# Patient Record
Sex: FEMALE | Race: WHITE
[De-identification: names, ages, dates, MRNs, and addresses within clinical notes are randomized per-mention and may not be internally consistent; named-entity substitution may affect disease eponyms.]

---

## 2019-02-13 ENCOUNTER — HOSPITAL ENCOUNTER (EMERGENCY)
Dept: HOSPITAL 47 - EC | Age: 34
Discharge: HOME | End: 2019-02-13
Payer: COMMERCIAL

## 2019-02-13 VITALS — RESPIRATION RATE: 15 BRPM | SYSTOLIC BLOOD PRESSURE: 129 MMHG | HEART RATE: 86 BPM | DIASTOLIC BLOOD PRESSURE: 83 MMHG

## 2019-02-13 VITALS — TEMPERATURE: 98.1 F

## 2019-02-13 DIAGNOSIS — R09.81: ICD-10-CM

## 2019-02-13 DIAGNOSIS — F17.200: ICD-10-CM

## 2019-02-13 DIAGNOSIS — Z86.69: ICD-10-CM

## 2019-02-13 DIAGNOSIS — H57.11: ICD-10-CM

## 2019-02-13 DIAGNOSIS — R51: Primary | ICD-10-CM

## 2019-02-13 LAB
ALBUMIN SERPL-MCNC: 4.6 G/DL (ref 3.5–5)
ALP SERPL-CCNC: 43 U/L (ref 38–126)
ALT SERPL-CCNC: 26 U/L (ref 9–52)
ANION GAP SERPL CALC-SCNC: 10 MMOL/L
AST SERPL-CCNC: 16 U/L (ref 14–36)
BASOPHILS # BLD AUTO: 0 K/UL (ref 0–0.2)
BASOPHILS NFR BLD AUTO: 0 %
BUN SERPL-SCNC: 10 MG/DL (ref 7–17)
CALCIUM SPEC-MCNC: 9.8 MG/DL (ref 8.4–10.2)
CHLORIDE SERPL-SCNC: 107 MMOL/L (ref 98–107)
CO2 SERPL-SCNC: 23 MMOL/L (ref 22–30)
EOSINOPHIL # BLD AUTO: 0.1 K/UL (ref 0–0.7)
EOSINOPHIL NFR BLD AUTO: 1 %
ERYTHROCYTE [DISTWIDTH] IN BLOOD BY AUTOMATED COUNT: 4.01 M/UL (ref 3.8–5.4)
ERYTHROCYTE [DISTWIDTH] IN BLOOD: 13.6 % (ref 11.5–15.5)
GLUCOSE SERPL-MCNC: 83 MG/DL (ref 74–99)
HCT VFR BLD AUTO: 34.4 % (ref 34–46)
HGB BLD-MCNC: 11.3 GM/DL (ref 11.4–16)
LYMPHOCYTES # SPEC AUTO: 1.1 K/UL (ref 1–4.8)
LYMPHOCYTES NFR SPEC AUTO: 20 %
MCH RBC QN AUTO: 28.3 PG (ref 25–35)
MCHC RBC AUTO-ENTMCNC: 32.9 G/DL (ref 31–37)
MCV RBC AUTO: 85.9 FL (ref 80–100)
MONOCYTES # BLD AUTO: 0.2 K/UL (ref 0–1)
MONOCYTES NFR BLD AUTO: 4 %
NEUTROPHILS # BLD AUTO: 4 K/UL (ref 1.3–7.7)
NEUTROPHILS NFR BLD AUTO: 74 %
PLATELET # BLD AUTO: 327 K/UL (ref 150–450)
POTASSIUM SERPL-SCNC: 4.3 MMOL/L (ref 3.5–5.1)
PROT SERPL-MCNC: 7.6 G/DL (ref 6.3–8.2)
SODIUM SERPL-SCNC: 140 MMOL/L (ref 137–145)
WBC # BLD AUTO: 5.4 K/UL (ref 3.8–10.6)

## 2019-02-13 PROCEDURE — 96361 HYDRATE IV INFUSION ADD-ON: CPT

## 2019-02-13 PROCEDURE — 96374 THER/PROPH/DIAG INJ IV PUSH: CPT

## 2019-02-13 PROCEDURE — 96375 TX/PRO/DX INJ NEW DRUG ADDON: CPT

## 2019-02-13 PROCEDURE — 99284 EMERGENCY DEPT VISIT MOD MDM: CPT

## 2019-02-13 PROCEDURE — 85025 COMPLETE CBC W/AUTO DIFF WBC: CPT

## 2019-02-13 PROCEDURE — 36415 COLL VENOUS BLD VENIPUNCTURE: CPT

## 2019-02-13 PROCEDURE — 70450 CT HEAD/BRAIN W/O DYE: CPT

## 2019-02-13 PROCEDURE — 85652 RBC SED RATE AUTOMATED: CPT

## 2019-02-13 PROCEDURE — 80053 COMPREHEN METABOLIC PANEL: CPT

## 2019-02-13 NOTE — CT
EXAMINATION TYPE: CT brain wo con

 

DATE OF EXAM: 2/13/2019

 

COMPARISON: None

 

HISTORY: headache

 

CT DLP: 1038.4 mGycm

 

Unenhanced CT of the brain was performed.  

 

The ventricles, basal cisterns and sulci overlying the cerebral convexities demonstrate a normal appe
arance.  

 

There is no evidence for intracranial hemorrhage or sulcal effacement.  

 

No mass effects are seen.  

 

Osseous calvarium is intact. Mild sphenoid sinus mucosal thickening.

 

If symptoms persist consider MRI as clinically warranted.  

 

IMPRESSION:

 

1.  No acute intracranial process is seen at this time.

## 2019-02-13 NOTE — ED
General Adult HPI





- General


Chief complaint: Eye Problems


Stated complaint: Eye problems


Time Seen by Provider: 02/13/19 13:18


Source: patient, RN notes reviewed


Mode of arrival: ambulatory


Limitations: no limitations





- History of Present Illness


Initial comments: 





33-year-old female presents to the emergency department for a chief complaint 

of headache.  Patient states the headache is on the right side of her head.  

Patient states yesterday she started to feel like she had congestion in the 

nose.  She states she was also sneezing a lot.  Patient states today she woke 

up and started to have right eye pain.  She states congestion on left side of 

face and nose has resolved but she still has congestion on the right side.  

Patient does have a history of migraines.  She states this head pain is similar 

to previous migraines.  However patient states her eye does not usually heart 

with this.  Patient does state it was watering earlier.  She denies drainage 

otherwise.  She states it feels somewhat swollen around the eye.  She denies 

fevers or chills.  She denies any neck stiffness.Patient has no other 

complaints at this time including shortness of breath, chest pain, abdominal 

pain, nausea or vomiting, headache, or visual changes.





- Related Data


 Home Medications











 Medication  Instructions  Recorded  Confirmed


 


Phenylephrine HCl [Sudafed PE] 10 mg PO Q4H PRN 02/13/19 02/13/19











 Allergies











Allergy/AdvReac Type Severity Reaction Status Date / Time


 


No Known Allergies Allergy   Verified 02/13/19 13:34














Review of Systems


ROS Statement: 


Those systems with pertinent positive or pertinent negative responses have been 

documented in the HPI.





ROS Other: All systems not noted in ROS Statement are negative.





Past Medical History


Past Medical History: No Reported History


History of Any Multi-Drug Resistant Organisms: None Reported


Past Surgical History: Back Surgery, Tubal Ligation


Additional Past Surgical History / Comment(s): colon biopsy


Past Psychological History: No Psychological Hx Reported


Smoking Status: Current some day smoker


Past Alcohol Use History: Occasional


Past Drug Use History: None Reported





General Exam


Limitations: no limitations


General appearance: alert, in no apparent distress


Head exam: Present: atraumatic, normocephalic, normal inspection


Eye exam: Present: normal appearance, PERRL, EOMI.  Absent: scleral icterus, 

conjunctival injection (No conjunctival erythema.), periorbital swelling (I do 

not appreciate any significant edema noted around the right eye), periorbital 

tenderness


ENT exam: Present: normal exam, normal oropharynx, mucous membranes moist, TM's 

normal bilaterally, normal external ear exam


Neck exam: Present: normal inspection, full ROM.  Absent: tenderness, 

meningismus, lymphadenopathy


Respiratory exam: Present: normal lung sounds bilaterally.  Absent: respiratory 

distress, wheezes, rales, rhonchi, stridor


Cardiovascular Exam: Present: regular rate, normal rhythm, normal heart sounds.

  Absent: systolic murmur, diastolic murmur, rubs, gallop, clicks


Neurological exam: Present: alert, oriented X3, CN II-XII intact, normal gait


  ** Expanded


Speech: Present: fluid speech


Cranial nerves: EOM's Intact: Normal, Tongue Deviation: Normal, Nystagmus: 

Normal, Facial Sensation: Normal


Cerebellar function: Finger to Nose: Normal


Upper motor neuron: Pronator Drift: Normal


Sensory exam: Upper Extremity Light Touch: Normal, Upper Extremity Pin Prick: 

Normal, Lower Extremity Light Touch: Normal, Lower Extremity Pin Prick: Normal


Motor strength exam: RUE: 5, LUE: 5, RLE: 5, LLE: 5


Eye Response: (4) open spontaneously


Motor Response: (6) obeys commands


Verbal Response: (5) oriented


Juanita Total: 15


Psychiatric exam: Present: normal affect, normal mood





Course


 Vital Signs











  02/13/19 02/13/19 02/13/19





  13:04 15:21 16:25


 


Temperature 98.1 F  


 


Pulse Rate 102 H 85 93


 


Respiratory 18 16 16





Rate   


 


Blood Pressure 131/87 130/88 118/85


 


O2 Sat by Pulse 98 100 100





Oximetry   














  02/13/19





  17:33


 


Temperature 


 


Pulse Rate 86


 


Respiratory 15





Rate 


 


Blood Pressure 129/83


 


O2 Sat by Pulse 100





Oximetry 














Medical Decision Making





- Medical Decision Making





33-year-old female presents to the emergency department for a chief complaint 

of right-sided headache.  Patient states this started this morning.  She states 

it came on gradually throughout the night.  Patient states this pain is 

consistent with previous migraines.  However today I pain began which is not 

usual for patient.  She denies visual changes, double vision, blurry vision.  

No focal neuro deficits on exam.  I do not notice any appreciable swelling 

noted of the right eye, no erythema of the periorbital structures or evidence 

of cellulitis. No pain with EOM. No erythema of the conjunctiva.  No drainage 

noted from the eye.  CT brain was negative for acute process.  CBC and CMP are 

unremarkable.  White blood cell count is 5.4, within normal limits.  Patient is 

afebrile.  Patient was given pain medication and is feeling much better at this 

time.  She was given referral to primary care.  However I did request the 

patient return here to the emergency Department if she has any worsening 

symptoms that she agrees with.  She will follow up with primary care in 1-2 

days.





- Lab Data


Result diagrams: 


 02/13/19 15:30





 02/13/19 15:30


 Lab Results











  02/13/19 02/13/19 Range/Units





  15:30 15:30 


 


WBC  5.4   (3.8-10.6)  k/uL


 


RBC  4.01   (3.80-5.40)  m/uL


 


Hgb  11.3 L   (11.4-16.0)  gm/dL


 


Hct  34.4   (34.0-46.0)  %


 


MCV  85.9   (80.0-100.0)  fL


 


MCH  28.3   (25.0-35.0)  pg


 


MCHC  32.9   (31.0-37.0)  g/dL


 


RDW  13.6   (11.5-15.5)  %


 


Plt Count  327   (150-450)  k/uL


 


Neutrophils %  74   %


 


Lymphocytes %  20   %


 


Monocytes %  4   %


 


Eosinophils %  1   %


 


Basophils %  0   %


 


Neutrophils #  4.0   (1.3-7.7)  k/uL


 


Lymphocytes #  1.1   (1.0-4.8)  k/uL


 


Monocytes #  0.2   (0-1.0)  k/uL


 


Eosinophils #  0.1   (0-0.7)  k/uL


 


Basophils #  0.0   (0-0.2)  k/uL


 


ESR  13   (0-20)  mm/hr


 


Sodium   140  (137-145)  mmol/L


 


Potassium   4.3  (3.5-5.1)  mmol/L


 


Chloride   107  ()  mmol/L


 


Carbon Dioxide   23  (22-30)  mmol/L


 


Anion Gap   10  mmol/L


 


BUN   10  (7-17)  mg/dL


 


Creatinine   0.67  (0.52-1.04)  mg/dL


 


Est GFR (CKD-EPI)AfAm   >90  (>60 ml/min/1.73 sqM)  


 


Est GFR (CKD-EPI)NonAf   >90  (>60 ml/min/1.73 sqM)  


 


Glucose   83  (74-99)  mg/dL


 


Calcium   9.8  (8.4-10.2)  mg/dL


 


Total Bilirubin   0.5  (0.2-1.3)  mg/dL


 


AST   16  (14-36)  U/L


 


ALT   26  (9-52)  U/L


 


Alkaline Phosphatase   43  ()  U/L


 


Total Protein   7.6  (6.3-8.2)  g/dL


 


Albumin   4.6  (3.5-5.0)  g/dL














Disposition


Clinical Impression: 


 Headache





Disposition: HOME SELF-CARE


Condition: Good


Instructions (If sedation given, give patient instructions):  Migraine Headache 

(ED), Eye Pain (ED)


Additional Instructions: 


Please take Motrin and Tylenol for pain.  Please follow-up with primary care in 

1-2 days.  Please return to the emergency department if you have any worsening 

symptoms.


Is patient prescribed a controlled substance at d/c from ED?: No


Referrals: 


Ashley Tobar MD [STAFF PHYSICIAN] - 1-2 days


Time of Disposition: 16:52

## 2020-11-04 ENCOUNTER — HOSPITAL ENCOUNTER (EMERGENCY)
Dept: HOSPITAL 47 - EC | Age: 35
Discharge: HOME | End: 2020-11-04
Payer: COMMERCIAL

## 2020-11-04 VITALS — SYSTOLIC BLOOD PRESSURE: 136 MMHG | DIASTOLIC BLOOD PRESSURE: 96 MMHG | RESPIRATION RATE: 17 BRPM | HEART RATE: 100 BPM

## 2020-11-04 VITALS — TEMPERATURE: 97.4 F

## 2020-11-04 DIAGNOSIS — R00.0: ICD-10-CM

## 2020-11-04 DIAGNOSIS — R00.2: Primary | ICD-10-CM

## 2020-11-04 DIAGNOSIS — F17.290: ICD-10-CM

## 2020-11-04 LAB
ALBUMIN SERPL-MCNC: 5.4 G/DL (ref 3.5–5)
ALP SERPL-CCNC: 59 U/L (ref 38–126)
ALT SERPL-CCNC: 11 U/L (ref 4–34)
ANION GAP SERPL CALC-SCNC: 14 MMOL/L
APTT BLD: 20.5 SEC (ref 22–30)
AST SERPL-CCNC: 19 U/L (ref 14–36)
BASOPHILS # BLD AUTO: 0 K/UL (ref 0–0.2)
BASOPHILS NFR BLD AUTO: 0 %
BUN SERPL-SCNC: 10 MG/DL (ref 7–17)
CALCIUM SPEC-MCNC: 10.7 MG/DL (ref 8.4–10.2)
CHLORIDE SERPL-SCNC: 107 MMOL/L (ref 98–107)
CO2 SERPL-SCNC: 19 MMOL/L (ref 22–30)
D DIMER PPP FEU-MCNC: 0.21 MG/L FEU (ref ?–0.6)
EOSINOPHIL # BLD AUTO: 0 K/UL (ref 0–0.7)
EOSINOPHIL NFR BLD AUTO: 0 %
ERYTHROCYTE [DISTWIDTH] IN BLOOD BY AUTOMATED COUNT: 4.61 M/UL (ref 3.8–5.4)
ERYTHROCYTE [DISTWIDTH] IN BLOOD: 14.8 % (ref 11.5–15.5)
GLUCOSE SERPL-MCNC: 92 MG/DL (ref 74–99)
HCT VFR BLD AUTO: 35.1 % (ref 34–46)
HGB BLD-MCNC: 10.5 GM/DL (ref 11.4–16)
INR PPP: 1 (ref ?–1.2)
LYMPHOCYTES # SPEC AUTO: 1.4 K/UL (ref 1–4.8)
LYMPHOCYTES NFR SPEC AUTO: 19 %
MAGNESIUM SPEC-SCNC: 2.2 MG/DL (ref 1.6–2.3)
MCH RBC QN AUTO: 22.9 PG (ref 25–35)
MCHC RBC AUTO-ENTMCNC: 30 G/DL (ref 31–37)
MCV RBC AUTO: 76.2 FL (ref 80–100)
MONOCYTES # BLD AUTO: 0.2 K/UL (ref 0–1)
MONOCYTES NFR BLD AUTO: 3 %
NEUTROPHILS # BLD AUTO: 5.8 K/UL (ref 1.3–7.7)
NEUTROPHILS NFR BLD AUTO: 77 %
PH UR: 6 [PH] (ref 5–8)
PLATELET # BLD AUTO: 444 K/UL (ref 150–450)
POTASSIUM SERPL-SCNC: 4.2 MMOL/L (ref 3.5–5.1)
PROT SERPL-MCNC: 9.4 G/DL (ref 6.3–8.2)
PT BLD: 10.1 SEC (ref 9–12)
SODIUM SERPL-SCNC: 140 MMOL/L (ref 137–145)
SP GR UR: 1.01 (ref 1–1.03)
UROBILINOGEN UR QL STRIP: <2 MG/DL (ref ?–2)
WBC # BLD AUTO: 7.5 K/UL (ref 3.8–10.6)

## 2020-11-04 PROCEDURE — 83735 ASSAY OF MAGNESIUM: CPT

## 2020-11-04 PROCEDURE — 84484 ASSAY OF TROPONIN QUANT: CPT

## 2020-11-04 PROCEDURE — 85730 THROMBOPLASTIN TIME PARTIAL: CPT

## 2020-11-04 PROCEDURE — 71046 X-RAY EXAM CHEST 2 VIEWS: CPT

## 2020-11-04 PROCEDURE — 99285 EMERGENCY DEPT VISIT HI MDM: CPT

## 2020-11-04 PROCEDURE — 93005 ELECTROCARDIOGRAM TRACING: CPT

## 2020-11-04 PROCEDURE — 81003 URINALYSIS AUTO W/O SCOPE: CPT

## 2020-11-04 PROCEDURE — 85025 COMPLETE CBC W/AUTO DIFF WBC: CPT

## 2020-11-04 PROCEDURE — 85379 FIBRIN DEGRADATION QUANT: CPT

## 2020-11-04 PROCEDURE — 96374 THER/PROPH/DIAG INJ IV PUSH: CPT

## 2020-11-04 PROCEDURE — 96361 HYDRATE IV INFUSION ADD-ON: CPT

## 2020-11-04 PROCEDURE — 80053 COMPREHEN METABOLIC PANEL: CPT

## 2020-11-04 PROCEDURE — 84443 ASSAY THYROID STIM HORMONE: CPT

## 2020-11-04 PROCEDURE — 36415 COLL VENOUS BLD VENIPUNCTURE: CPT

## 2020-11-04 PROCEDURE — 85610 PROTHROMBIN TIME: CPT

## 2020-11-04 NOTE — ED
General Adult HPI





- General


Chief complaint: Arrhythmia/Palpitations


Stated complaint: Racing heart,anxiety


Time Seen by Provider: 11/04/20 19:15


Source: patient


Mode of arrival: ambulatory


Limitations: no limitations





- History of Present Illness


Initial comments: 





35-year-old female presents to the emergency Department with complaints of 

elevated heart rate and palpitations intermittently throughout the day but 

becoming more persistent since 1600.  Patient states she has a mild headache and

slight dizziness as well.  Denies chest pain, jaw pain, neck pain, back pain, 

fever and chills.  Patient reports a history of anxiety and hypertension that 

were situational and have not been an issue since 2017.  Patient states she does

have a history of hemophilia B and is concerned about possible anemia due to 

heavy menses.  Patient denies any recent rash, cough, shortness of breath, 

abdominal pain, nausea, vomiting, diarrhea, constipation, numbness, tingling, 

dizziness, weakness, hematuria, dysuria, urinary urgency, urinary frequency, 

visual changes, or any other complaints.





- Related Data


                                Home Medications











 Medication  Instructions  Recorded  Confirmed


 


Ibuprofen [Motrin Ib] 600 mg PO DAILY PRN 11/04/20 11/04/20








                                  Previous Rx's











 Medication  Instructions  Recorded


 


LORazepam [Ativan] 1 mg PO HS 3 Days #3 tab 11/04/20











                                    Allergies











Allergy/AdvReac Type Severity Reaction Status Date / Time


 


No Known Allergies Allergy   Verified 11/04/20 21:13














Review of Systems


ROS Statement: 


Those systems with pertinent positive or pertinent negative responses have been 

documented in the HPI.





ROS Other: All systems not noted in ROS Statement are negative.





Past Medical History


Past Medical History: No Reported History


History of Any Multi-Drug Resistant Organisms: None Reported


Past Surgical History: Back Surgery, Tubal Ligation


Additional Past Surgical History / Comment(s): colon biopsy


Past Psychological History: Anxiety


Smoking Status: Vaper


Past Alcohol Use History: Occasional


Past Drug Use History: None Reported





General Exam


Limitations: no limitations (Well-developed, well-nourished female in no acute 

distress.  Initial temperature 97.4, pulse 124, respirations 22, blood pressure 

174/105, pulse ox 100% on room air.)


General appearance: alert, in no apparent distress


Respiratory exam: Present: normal lung sounds bilaterally, other (shallow 

breaths and tachypnea).  Absent: respiratory distress, wheezes, rales, rhonchi, 

stridor


Cardiovascular Exam: Present: tachycardia, normal heart sounds.  Absent: 

systolic murmur, diastolic murmur, rubs, gallop, clicks


GI/Abdominal exam: Present: soft, normal bowel sounds.  Absent: distended, 

tenderness, guarding, rebound, rigid


Neurological exam: Present: alert, oriented X3, CN II-XII intact


Psychiatric exam: Present: anxious


Skin exam: Present: warm, dry, intact, normal color.  Absent: rash





Course


                                   Vital Signs











  11/04/20 11/04/20





  18:43 21:06


 


Temperature 97.4 F L 


 


Pulse Rate 124 H 100


 


Respiratory 22 17





Rate  


 


Blood Pressure 174/105 136/96


 


O2 Sat by Pulse 100 99





Oximetry  














Medical Decision Making





- Medical Decision Making





25-year-old female with history of hypertension and and anxiety presents to the 

emergency department this evening with complaints of palpitations and racing 

heart.  Patient states her symptoms have been intermittent all day but became 

more persistent after 4:00 this afternoon.  Upon presentation, the patient's 

heart rate is 124 and blood pressure is 174/105. Patient states she has not had 

to take medication for her hypertension or anxiety since 2017, but is currently 

currently experiencing some increased stress due to the recent hospitalization 

of her son.  Patient denies any chest pain, jaw pain, neck pain, or back pain.  

States she is concerned about anemia due to heavy menses and a history of 

hemophilia. Hemoglobin is 10.5, hematocrit 35.1.  Patient was given Ativan 

reports significant improvement in overall symptoms.  Encouraged to establish 

with a primary care provider in order for close follow-up.  Return parameters 

discussed in detail.  Patient verbalizes understanding and agrees with this 

plan.  Work note was provided per her request





- Lab Data


Result diagrams: 


                                 11/04/20 20:20





                                 11/04/20 20:20


                                   Lab Results











  11/04/20 11/04/20 11/04/20 Range/Units





  20:20 20:20 20:20 


 


WBC  7.5    (3.8-10.6)  k/uL


 


RBC  4.61    (3.80-5.40)  m/uL


 


Hgb  10.5 L    (11.4-16.0)  gm/dL


 


Hct  35.1    (34.0-46.0)  %


 


MCV  76.2 L    (80.0-100.0)  fL


 


MCH  22.9 L    (25.0-35.0)  pg


 


MCHC  30.0 L    (31.0-37.0)  g/dL


 


RDW  14.8    (11.5-15.5)  %


 


Plt Count  444    (150-450)  k/uL


 


Neutrophils %  77    %


 


Lymphocytes %  19    %


 


Monocytes %  3    %


 


Eosinophils %  0    %


 


Basophils %  0    %


 


Neutrophils #  5.8    (1.3-7.7)  k/uL


 


Lymphocytes #  1.4    (1.0-4.8)  k/uL


 


Monocytes #  0.2    (0-1.0)  k/uL


 


Eosinophils #  0.0    (0-0.7)  k/uL


 


Basophils #  0.0    (0-0.2)  k/uL


 


Hypochromasia  Marked    


 


Poikilocytosis  Slight    


 


Microcytosis  Slight    


 


PT   10.1   (9.0-12.0)  sec


 


INR   1.0   (<1.2)  


 


APTT   20.5 L   (22.0-30.0)  sec


 


D-Dimer   0.21   (<0.60)  mg/L FEU


 


Sodium    140  (137-145)  mmol/L


 


Potassium    4.2  (3.5-5.1)  mmol/L


 


Chloride    107  ()  mmol/L


 


Carbon Dioxide    19 L  (22-30)  mmol/L


 


Anion Gap    14  mmol/L


 


BUN    10  (7-17)  mg/dL


 


Creatinine    0.61  (0.52-1.04)  mg/dL


 


Est GFR (CKD-EPI)AfAm    >90  (>60 ml/min/1.73 sqM)  


 


Est GFR (CKD-EPI)NonAf    >90  (>60 ml/min/1.73 sqM)  


 


Glucose    92  (74-99)  mg/dL


 


Calcium    10.7 H  (8.4-10.2)  mg/dL


 


Magnesium    2.2  (1.6-2.3)  mg/dL


 


Total Bilirubin    0.6  (0.2-1.3)  mg/dL


 


AST    19  (14-36)  U/L


 


ALT    11  (4-34)  U/L


 


Alkaline Phosphatase    59  ()  U/L


 


Troponin I     (0.000-0.034)  ng/mL


 


Total Protein    9.4 H  (6.3-8.2)  g/dL


 


Albumin    5.4 H  (3.5-5.0)  g/dL


 


TSH    0.870  (0.465-4.680)  mIU/L


 


Urine Color     


 


Urine Appearance     (Clear)  


 


Urine pH     (5.0-8.0)  


 


Ur Specific Gravity     (1.001-1.035)  


 


Urine Protein     (Negative)  


 


Urine Glucose (UA)     (Negative)  


 


Urine Ketones     (Negative)  


 


Urine Blood     (Negative)  


 


Urine Nitrite     (Negative)  


 


Urine Bilirubin     (Negative)  


 


Urine Urobilinogen     (<2.0)  mg/dL


 


Ur Leukocyte Esterase     (Negative)  














  11/04/20 11/04/20 Range/Units





  20:20 20:20 


 


WBC    (3.8-10.6)  k/uL


 


RBC    (3.80-5.40)  m/uL


 


Hgb    (11.4-16.0)  gm/dL


 


Hct    (34.0-46.0)  %


 


MCV    (80.0-100.0)  fL


 


MCH    (25.0-35.0)  pg


 


MCHC    (31.0-37.0)  g/dL


 


RDW    (11.5-15.5)  %


 


Plt Count    (150-450)  k/uL


 


Neutrophils %    %


 


Lymphocytes %    %


 


Monocytes %    %


 


Eosinophils %    %


 


Basophils %    %


 


Neutrophils #    (1.3-7.7)  k/uL


 


Lymphocytes #    (1.0-4.8)  k/uL


 


Monocytes #    (0-1.0)  k/uL


 


Eosinophils #    (0-0.7)  k/uL


 


Basophils #    (0-0.2)  k/uL


 


Hypochromasia    


 


Poikilocytosis    


 


Microcytosis    


 


PT    (9.0-12.0)  sec


 


INR    (<1.2)  


 


APTT    (22.0-30.0)  sec


 


D-Dimer    (<0.60)  mg/L FEU


 


Sodium    (137-145)  mmol/L


 


Potassium    (3.5-5.1)  mmol/L


 


Chloride    ()  mmol/L


 


Carbon Dioxide    (22-30)  mmol/L


 


Anion Gap    mmol/L


 


BUN    (7-17)  mg/dL


 


Creatinine    (0.52-1.04)  mg/dL


 


Est GFR (CKD-EPI)AfAm    (>60 ml/min/1.73 sqM)  


 


Est GFR (CKD-EPI)NonAf    (>60 ml/min/1.73 sqM)  


 


Glucose    (74-99)  mg/dL


 


Calcium    (8.4-10.2)  mg/dL


 


Magnesium    (1.6-2.3)  mg/dL


 


Total Bilirubin    (0.2-1.3)  mg/dL


 


AST    (14-36)  U/L


 


ALT    (4-34)  U/L


 


Alkaline Phosphatase    ()  U/L


 


Troponin I  <0.012   (0.000-0.034)  ng/mL


 


Total Protein    (6.3-8.2)  g/dL


 


Albumin    (3.5-5.0)  g/dL


 


TSH    (0.465-4.680)  mIU/L


 


Urine Color   Light Yellow  


 


Urine Appearance   Clear  (Clear)  


 


Urine pH   6.0  (5.0-8.0)  


 


Ur Specific Gravity   1.006  (1.001-1.035)  


 


Urine Protein   Negative  (Negative)  


 


Urine Glucose (UA)   Negative  (Negative)  


 


Urine Ketones   Negative  (Negative)  


 


Urine Blood   Negative  (Negative)  


 


Urine Nitrite   Negative  (Negative)  


 


Urine Bilirubin   Negative  (Negative)  


 


Urine Urobilinogen   <2.0  (<2.0)  mg/dL


 


Ur Leukocyte Esterase   Negative  (Negative)  














- EKG Data


EKG shows normal: sinus rhythm


Rate: tachycardia


EKG Comments: 





EKG obtained at 1906 shows sinus tachycardia.  Ventricular rate 115, TX interval

 120, QRS duration 84, QT/QTc 332/459.  Normal ECG other than rate.





Disposition


Clinical Impression: 


 Palpitations





Disposition: HOME SELF-CARE


Condition: Good


Instructions (If sedation given, give patient instructions):  Heart Palpitations

 (ED)


Additional Instructions: 


Rest.  Increase fluids.  Follow up with primary care provider for recheck in the

 next 1-2 days.  Return to emergency department with any new, worsening, or 

concerning symptoms including chest pain, shortness of breath, or difficulty 

breathing.


Prescriptions: 


LORazepam [Ativan] 1 mg PO HS 3 Days #3 tab


Is patient prescribed a controlled substance at d/c from ED?: No


Referrals: 


Brady Abel DO [STAFF PHYSICIAN] - 1-2 days


David John MD [STAFF PHYSICIAN] - 1-2 days


Time of Disposition: 21:44

## 2020-11-04 NOTE — XR
EXAMINATION TYPE: XR chest 2V

 

DATE OF EXAM: 11/4/2020

 

COMPARISON: None

 

INDICATION: Dysrhythmia

 

TECHNIQUE:  Frontal and lateral views of the chest are obtained.

 

FINDINGS:  

The heart size is normal.  

The pulmonary vasculature is normal.

The lungs are clear.

 

IMPRESSION:  

1. No acute pulmonary process.